# Patient Record
Sex: MALE | Race: WHITE | NOT HISPANIC OR LATINO | Employment: FULL TIME | URBAN - METROPOLITAN AREA
[De-identification: names, ages, dates, MRNs, and addresses within clinical notes are randomized per-mention and may not be internally consistent; named-entity substitution may affect disease eponyms.]

---

## 2022-12-20 ENCOUNTER — OFFICE VISIT (OUTPATIENT)
Dept: SURGERY | Facility: CLINIC | Age: 57
End: 2022-12-20
Payer: COMMERCIAL

## 2022-12-20 VITALS
TEMPERATURE: 97 F | WEIGHT: 192 LBS | HEART RATE: 65 BPM | BODY MASS INDEX: 27.49 KG/M2 | DIASTOLIC BLOOD PRESSURE: 85 MMHG | HEIGHT: 70 IN | SYSTOLIC BLOOD PRESSURE: 133 MMHG

## 2022-12-20 DIAGNOSIS — L98.9 SKIN LESION OF SCALP: Primary | ICD-10-CM

## 2022-12-20 PROCEDURE — 11421 PR EXC SKIN BENIG 0.6-1 CM REMAINDR BODY: ICD-10-PCS | Mod: S$GLB,,, | Performed by: SURGERY

## 2022-12-20 PROCEDURE — 99204 PR OFFICE/OUTPT VISIT, NEW, LEVL IV, 45-59 MIN: ICD-10-PCS | Mod: 25,S$GLB,, | Performed by: SURGERY

## 2022-12-20 PROCEDURE — 11421 EXC H-F-NK-SP B9+MARG 0.6-1: CPT | Mod: S$GLB,,, | Performed by: SURGERY

## 2022-12-20 PROCEDURE — 99204 OFFICE O/P NEW MOD 45 MIN: CPT | Mod: 25,S$GLB,, | Performed by: SURGERY

## 2022-12-20 RX ORDER — LOSARTAN POTASSIUM 100 MG/1
100 TABLET ORAL DAILY
COMMUNITY

## 2022-12-20 NOTE — PROCEDURES
"Yfn Be is a 57 y.o. male patient.    Temp: 97.3 °F (36.3 °C) (12/20/22 1601)  Pulse: 65 (12/20/22 1601)  BP: 133/85 (12/20/22 1601)  Weight: 87.1 kg (192 lb) (12/20/22 1601)  Height: 5' 10" (177.8 cm) (12/20/22 1601)       Excision of Lesion - left temple    Date/Time: 12/20/2022 3:30 PM  Performed by: Clement Medina III, MD  Authorized by: Clement Medina III, MD     Local anesthesia used?: Yes    Anesthesia:  Local infiltration  Local anesthetic:  Lidocaine 1% with epinephrine  Anesthetic total (ml):  3  : raised, scaly skin lesion.  Body area:  Scalp (left temple)  Laterality:  Left  Position:  Lateral   Patient was prepped and draped in the normal sterile fashion.  Anesthesia:  Local infiltration  Local anesthetic:  Lidocaine 1% with epinephrine  Excision type:  Tumor  Excision depth:  Subcutaneous  Excision size (cm):  1  Scalpel size:  15  Incision type:  Elliptical  Specimens?: Yes     Specimens submitted to pathology.   Hemostasis was obtained.  Estimated blood loss (cc):  2  Wound closure:  Simple  Wound repair size (cm):  1  Sutures: 3-0 chromic.  Sterile dressings:  Tegaderm  Post-op diagnosis:  Same as pre-op diagnosis.   Needle, instrument, and sponge counts were correct.   Patient tolerated the procedure well with no immediate complications.   Post-operative instructions were provided for the patient.   Patient was discharged and will follow up for wound check and pathology results.    12/20/2022    "

## 2022-12-21 NOTE — PROGRESS NOTES
Subjective:       Patient ID: Yfn Be is a 57 y.o. male.    Chief Complaint: Consult (Skin lesion Lt temple)      HPI:  57-year-old male presents with new raised skin lesion over the left temple.  He noticed the lesion while running his fingers across his temples last week.  There is no tenderness.  There is no erythema or ulceration.  No history of prior skin cancers.  No family history of skin cancer.  He reports many years of sun exposure.    Past Medical History:   Diagnosis Date    Hypertension      No past surgical history on file.  Review of patient's allergies indicates:  No Known Allergies  Medication List with Changes/Refills   Current Medications    LOSARTAN (COZAAR) 100 MG TABLET    Take 100 mg by mouth once daily.     History reviewed. No pertinent family history.  Social History     Socioeconomic History    Marital status: Single         Review of Systems    Objective:      Physical Exam  Constitutional:       General: He is not in acute distress.     Appearance: Normal appearance. He is well-developed. He is not toxic-appearing.   HENT:      Head: Normocephalic and atraumatic. No abrasion or laceration.        Comments: 8-10 mm by 4-5 mm raised, scaly lesion left temple.  No tenderness.  No ulceration.     Right Ear: External ear normal.      Left Ear: External ear normal.      Nose: Nose normal.   Eyes:      Pupils: Pupils are equal, round, and reactive to light.   Neck:      Trachea: Trachea normal. No tracheal deviation.   Cardiovascular:      Rate and Rhythm: Normal rate and regular rhythm.   Pulmonary:      Effort: Pulmonary effort is normal. No tachypnea, accessory muscle usage or respiratory distress.   Abdominal:      General: There is no distension.      Palpations: Abdomen is soft.      Tenderness: There is no abdominal tenderness.   Musculoskeletal:      Cervical back: Neck supple. Normal range of motion.   Lymphadenopathy:      Cervical: No cervical adenopathy.   Skin:     General:  Skin is warm.   Neurological:      Mental Status: He is alert and oriented to person, place, and time.      Coordination: Coordination normal.      Gait: Gait normal.   Psychiatric:         Speech: Speech normal.         Behavior: Behavior normal.       Assessment/Plan:   Skin lesion of scalp  -     Specimen to Pathology Dermatology and skin neoplasms  -     Excision of Lesion - left temple      The skin lesion was excised in the office today.  It was sent for specimen.  Follow-up 1 week for wound check in path results.

## 2022-12-24 ENCOUNTER — HOSPITAL ENCOUNTER (EMERGENCY)
Facility: HOSPITAL | Age: 57
Discharge: HOME | End: 2022-12-24
Attending: EMERGENCY MEDICINE
Payer: COMMERCIAL

## 2022-12-24 VITALS
OXYGEN SATURATION: 96 % | HEIGHT: 70 IN | BODY MASS INDEX: 27.49 KG/M2 | RESPIRATION RATE: 18 BRPM | HEART RATE: 70 BPM | DIASTOLIC BLOOD PRESSURE: 97 MMHG | SYSTOLIC BLOOD PRESSURE: 160 MMHG | WEIGHT: 192 LBS

## 2022-12-24 DIAGNOSIS — I10 HYPERTENSION, UNSPECIFIED TYPE: Primary | ICD-10-CM

## 2022-12-24 DIAGNOSIS — I48.0 PAROXYSMAL ATRIAL FIBRILLATION: ICD-10-CM

## 2022-12-24 LAB
ALBUMIN SERPL BCP-MCNC: 4.5 G/DL (ref 3.5–5.2)
ALP SERPL-CCNC: 54 U/L (ref 55–135)
ALT SERPL W/O P-5'-P-CCNC: 26 U/L (ref 10–44)
ANION GAP SERPL CALC-SCNC: 5 MMOL/L (ref 8–16)
AST SERPL-CCNC: 26 U/L (ref 10–40)
BASOPHILS # BLD AUTO: 0.03 K/UL (ref 0–0.2)
BASOPHILS NFR BLD: 0.6 % (ref 0–1.9)
BILIRUB SERPL-MCNC: 1.4 MG/DL (ref 0.1–1)
BUN SERPL-MCNC: 15 MG/DL (ref 6–20)
CALCIUM SERPL-MCNC: 9.4 MG/DL (ref 8.7–10.5)
CHLORIDE SERPL-SCNC: 106 MMOL/L (ref 95–110)
CO2 SERPL-SCNC: 27 MMOL/L (ref 23–29)
CREAT SERPL-MCNC: 1.2 MG/DL (ref 0.5–1.4)
DIFFERENTIAL METHOD: ABNORMAL
EOSINOPHIL # BLD AUTO: 0.2 K/UL (ref 0–0.5)
EOSINOPHIL NFR BLD: 3.3 % (ref 0–8)
ERYTHROCYTE [DISTWIDTH] IN BLOOD BY AUTOMATED COUNT: 12.2 % (ref 11.5–14.5)
EST. GFR  (NO RACE VARIABLE): >60 ML/MIN/1.73 M^2
GLUCOSE SERPL-MCNC: 106 MG/DL (ref 70–110)
HCT VFR BLD AUTO: 43.2 % (ref 40–54)
HGB BLD-MCNC: 14.8 G/DL (ref 14–18)
IMM GRANULOCYTES # BLD AUTO: 0.02 K/UL (ref 0–0.04)
IMM GRANULOCYTES NFR BLD AUTO: 0.4 % (ref 0–0.5)
LYMPHOCYTES # BLD AUTO: 1.5 K/UL (ref 1–4.8)
LYMPHOCYTES NFR BLD: 29.7 % (ref 18–48)
MAGNESIUM SERPL-MCNC: 2.1 MG/DL (ref 1.6–2.6)
MCH RBC QN AUTO: 32.4 PG (ref 27–31)
MCHC RBC AUTO-ENTMCNC: 34.3 G/DL (ref 32–36)
MCV RBC AUTO: 95 FL (ref 82–98)
MONOCYTES # BLD AUTO: 0.4 K/UL (ref 0.3–1)
MONOCYTES NFR BLD: 7.1 % (ref 4–15)
NEUTROPHILS # BLD AUTO: 3.1 K/UL (ref 1.8–7.7)
NEUTROPHILS NFR BLD: 58.9 % (ref 38–73)
NRBC BLD-RTO: 0 /100 WBC
PLATELET # BLD AUTO: 259 K/UL (ref 150–450)
PMV BLD AUTO: 9 FL (ref 9.2–12.9)
POTASSIUM SERPL-SCNC: 4.2 MMOL/L (ref 3.5–5.1)
PROT SERPL-MCNC: 7.4 G/DL (ref 6–8.4)
RBC # BLD AUTO: 4.57 M/UL (ref 4.6–6.2)
SODIUM SERPL-SCNC: 138 MMOL/L (ref 136–145)
TROPONIN I SERPL HS-MCNC: 7.8 PG/ML (ref 0–14.9)
TSH SERPL DL<=0.005 MIU/L-ACNC: 2.66 UIU/ML (ref 0.34–5.6)
WBC # BLD AUTO: 5.19 K/UL (ref 3.9–12.7)

## 2022-12-24 PROCEDURE — 36415 COLL VENOUS BLD VENIPUNCTURE: CPT | Performed by: EMERGENCY MEDICINE

## 2022-12-24 PROCEDURE — 93005 ELECTROCARDIOGRAM TRACING: CPT | Performed by: INTERNAL MEDICINE

## 2022-12-24 PROCEDURE — 93010 ELECTROCARDIOGRAM REPORT: CPT | Mod: ,,, | Performed by: INTERNAL MEDICINE

## 2022-12-24 PROCEDURE — 83735 ASSAY OF MAGNESIUM: CPT | Performed by: EMERGENCY MEDICINE

## 2022-12-24 PROCEDURE — 84484 ASSAY OF TROPONIN QUANT: CPT | Performed by: EMERGENCY MEDICINE

## 2022-12-24 PROCEDURE — 93010 EKG 12-LEAD: ICD-10-PCS | Mod: 76,,, | Performed by: INTERNAL MEDICINE

## 2022-12-24 PROCEDURE — 85025 COMPLETE CBC W/AUTO DIFF WBC: CPT | Performed by: EMERGENCY MEDICINE

## 2022-12-24 PROCEDURE — 84443 ASSAY THYROID STIM HORMONE: CPT | Performed by: EMERGENCY MEDICINE

## 2022-12-24 PROCEDURE — 99284 EMERGENCY DEPT VISIT MOD MDM: CPT | Mod: 25

## 2022-12-24 PROCEDURE — 80053 COMPREHEN METABOLIC PANEL: CPT | Performed by: EMERGENCY MEDICINE

## 2022-12-24 PROCEDURE — 25000003 PHARM REV CODE 250: Performed by: EMERGENCY MEDICINE

## 2022-12-24 RX ORDER — ACETAMINOPHEN AND CODEINE PHOSPHATE 300; 30 MG/1; MG/1
TABLET ORAL
COMMUNITY
End: 2023-09-12

## 2022-12-24 RX ORDER — METOPROLOL SUCCINATE 50 MG/1
TABLET, EXTENDED RELEASE ORAL
COMMUNITY
End: 2023-09-12

## 2022-12-24 RX ORDER — SIMVASTATIN 10 MG/1
TABLET, FILM COATED ORAL
COMMUNITY
End: 2023-09-12

## 2022-12-24 RX ORDER — PROMETHAZINE HYDROCHLORIDE 25 MG/1
TABLET ORAL
COMMUNITY
End: 2023-09-12

## 2022-12-24 RX ORDER — ROSUVASTATIN CALCIUM 10 MG/1
TABLET, COATED ORAL
COMMUNITY
End: 2023-09-12

## 2022-12-24 RX ORDER — FLECAINIDE ACETATE 50 MG/1
TABLET ORAL
COMMUNITY
End: 2023-09-12

## 2022-12-24 RX ORDER — DILTIAZEM HYDROCHLORIDE 5 MG/ML
0.25 INJECTION INTRAVENOUS
Status: DISCONTINUED | OUTPATIENT
Start: 2022-12-24 | End: 2022-12-24 | Stop reason: HOSPADM

## 2022-12-24 RX ORDER — PANTOPRAZOLE SODIUM 40 MG/1
TABLET, DELAYED RELEASE ORAL
COMMUNITY
End: 2023-09-12

## 2022-12-24 RX ORDER — KETOROLAC TROMETHAMINE 10 MG/1
TABLET, FILM COATED ORAL
COMMUNITY
End: 2023-09-12

## 2022-12-24 RX ORDER — ASPIRIN 325 MG
TABLET, DELAYED RELEASE (ENTERIC COATED) ORAL
COMMUNITY

## 2022-12-24 RX ORDER — AMLODIPINE BESYLATE 5 MG/1
5 TABLET ORAL DAILY
COMMUNITY
End: 2022-12-24

## 2022-12-24 RX ORDER — DILTIAZEM HYDROCHLORIDE 30 MG/1
120 TABLET, FILM COATED ORAL
Status: COMPLETED | OUTPATIENT
Start: 2022-12-24 | End: 2022-12-24

## 2022-12-24 RX ORDER — CARISOPRODOL 250 MG/1
TABLET ORAL
COMMUNITY
End: 2023-09-12

## 2022-12-24 RX ORDER — MEFLOQUINE HYDROCHLORIDE 250 MG/1
TABLET ORAL
COMMUNITY
End: 2023-09-12

## 2022-12-24 RX ORDER — ONDANSETRON 4 MG/1
TABLET, FILM COATED ORAL
COMMUNITY
End: 2023-09-12

## 2022-12-24 RX ORDER — CELECOXIB 200 MG/1
CAPSULE ORAL
COMMUNITY
End: 2023-09-12

## 2022-12-24 RX ORDER — DILTIAZEM HYDROCHLORIDE 120 MG/1
120 TABLET, FILM COATED ORAL DAILY
Qty: 30 TABLET | Refills: 11 | Status: SHIPPED | OUTPATIENT
Start: 2022-12-24 | End: 2023-09-12

## 2022-12-24 RX ADMIN — DILTIAZEM HYDROCHLORIDE 120 MG: 30 TABLET, FILM COATED ORAL at 12:12

## 2022-12-24 NOTE — ED PROVIDER NOTES
Encounter Date: 12/24/2022       History   No chief complaint on file.    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  He denies any associated chest pain, shortness of breath headache or dizziness.  He is had no thyroid disease or history of caffeine excess.  Does not smoke.  His last oral intake was at 8:00 p.m. last night with the exception of having taken his losartan 100 mg and Eliquis 5 mg this morning.  No history of any coronary disease.    Review of patient's allergies indicates:  No Known Allergies  Past Medical History:   Diagnosis Date    Hypertension      No past surgical history on file.  No family history on file.     Review of Systems   Constitutional:  Negative for chills, diaphoresis and fever.   HENT:  Negative for congestion, ear pain, rhinorrhea, sinus pressure, sinus pain, sore throat and trouble swallowing.    Respiratory:  Negative for cough, chest tightness and shortness of breath.    Cardiovascular:  Positive for palpitations. Negative for chest pain.   Gastrointestinal:  Negative for abdominal pain, nausea and vomiting.   Genitourinary:  Negative for difficulty urinating, flank pain, frequency and hematuria.   Skin:  Negative for pallor.   All other systems reviewed and are negative.    Physical Exam     Initial Vitals [12/24/22 1005]   BP Pulse Resp Temp SpO2   (!) 170/111 88 20 -- 97 %      MAP       --         Physical Exam    Vitals reviewed.  Constitutional: He appears well-developed and well-nourished. He is not diaphoretic. No distress.   HENT:   Head: Normocephalic and atraumatic.   Right Ear: External ear normal.   Left Ear: External ear normal.   Nose: Nose normal.   Mouth/Throat: Oropharynx is clear and moist.   Eyes: Conjunctivae and EOM are normal. Pupils are equal, round, and reactive to light.   Neck: Neck supple. No tracheal deviation present. No JVD present.   Normal range of  motion.  Cardiovascular:  Normal rate, normal heart sounds and intact distal pulses.     Exam reveals no gallop and no friction rub.       No murmur heard.  Irregular irregular tachycardic rhythm   Pulmonary/Chest: Breath sounds normal. No respiratory distress. He has no wheezes. He has no rhonchi. He has no rales. He exhibits no tenderness.   Abdominal: Abdomen is soft. Bowel sounds are normal. He exhibits no distension. There is no abdominal tenderness. There is no rebound and no guarding.   Musculoskeletal:         General: No tenderness or edema. Normal range of motion.      Cervical back: Normal range of motion and neck supple.     Lymphadenopathy:     He has no cervical adenopathy.   Neurological: He is alert and oriented to person, place, and time. He has normal strength and normal reflexes. No cranial nerve deficit or sensory deficit. GCS score is 15. GCS eye subscore is 4. GCS verbal subscore is 5. GCS motor subscore is 6.   Skin: Skin is warm and dry. Capillary refill takes less than 2 seconds. No rash noted. No erythema. No pallor.   Psychiatric: He has a normal mood and affect. His behavior is normal. Judgment and thought content normal.       ED Course   Procedures  Labs Reviewed   CBC W/ AUTO DIFFERENTIAL - Abnormal; Notable for the following components:       Result Value    RBC 4.57 (*)     MCH 32.4 (*)     MPV 9.0 (*)     All other components within normal limits   COMPREHENSIVE METABOLIC PANEL - Abnormal; Notable for the following components:    Total Bilirubin 1.4 (*)     Alkaline Phosphatase 54 (*)     Anion Gap 5 (*)     All other components within normal limits   MAGNESIUM   TSH   TROPONIN I HIGH SENSITIVITY   TROPONIN I HIGH SENSITIVITY   URINALYSIS, REFLEX TO URINE CULTURE        ECG Results              EKG 12-lead (In process)  Result time 12/24/22 10:27:52      In process by Interface, Lab In [facility] (12/24/22 10:27:52)                   Narrative:    Test Reason : I48.0,    Vent. Rate :  111 BPM     Atrial Rate : 141 BPM     P-R Int : 000 ms          QRS Dur : 086 ms      QT Int : 322 ms       P-R-T Axes : 000 043 041 degrees     QTc Int : 437 ms    Atrial fibrillation with rapid ventricular response  Abnormal ECG  No previous ECGs available    Referred By: [email or web address]   SELF           Confirmed By:                                   Imaging Results              X-Ray Chest AP Portable (Final result)  Result time 12/24/22 11:00:56      Final result by Dylan Redmond MD (12/24/22 11:00:56)                   Narrative:    HISTORY: Atrial fibrillation.    FINDINGS: Portable chest radiograph at 1025 hours with no prior studies for comparison shows the cardiomediastinal silhouette and pulmonary vasculature are within normal limits.    The lungs are normally expanded, with no consolidation, large pleural effusion, or evidence of pulmonary edema. No confluent infiltrates or pneumothorax. There are no significant osseous abnormalities.    IMPRESSION: No evidence of active cardiopulmonary disease.    Electronically signed by:  Dylan Redmond MD  12/24/2022 11:00 AM [facility] Workstation: [id number]                                     Medications   diltiaZEM injection 22 mg (0 mg Intravenous Hold 12/24/22 1030)   diltiaZEM tablet 120 mg (has no administration in time range)                Attending Attestation:             Attending ED Notes:   ED course and Medical decision-making:  This patient who has had a prior history of paroxysmal atrial fib but has not had any last 2 years since he had a ablation, was noted to have been in AFib with RVR upon presentation.  His blood pressure was also elevated despite having taken his losartan 100 mg and Norvasc 5 mg earlier.  The patient had screening labs obtained which were unremarkable including a normal potassium and magnesium.  Just before the patient was to have received IV Cardizem he spontaneously converted into a normal sinus rhythm.  Chest x-ray is unremarkable.  The  patient's repeat EKG after cardioversion was normal.  Subsequently I did speak to Dr. Pérez who suggested stopping his Norvasc and start him on Cardizem 120 mg daily.  He is to follow-up with Dr. Pérez in his office.                 Clinical Impression:   Final diagnoses:  [I48.0] Paroxysmal atrial fibrillation  [I10] Hypertension, unspecified type (Primary)        ED Disposition Condition    Discharge Stable          ED Prescriptions       Medication Sig Dispense Start Date End Date Auth. Provider    diltiaZEM (CARDIZEM) 120 MG tablet Take 1 tablet (120 mg total) by mouth once daily. 30 tablet 12/24/2022 12/24/2023 Edward Nielson Jr., MD          Follow-up Information       Follow up With Specialties Details Why Contact Info    Truman Pérez MD Interventional Cardiology, Cardiology, Cardiovascular Disease Schedule an appointment as soon as possible for a visit  Next week for re-evaluation [address]  [address]  [place] [address]  [phone]               Edward Nielson Jr., MD  12/24/22 1219

## 2022-12-24 NOTE — DISCHARGE INSTRUCTIONS
Stop amlodipine.  Start Cardizem as directed.  Continue losartan.  Follow-up with Dr. Truman Pérez.  Monitor your blood pressure daily.  Return to the ER as needed.  Continue Eliquis

## 2023-05-19 ENCOUNTER — HOSPITAL ENCOUNTER (EMERGENCY)
Facility: HOSPITAL | Age: 58
Discharge: HOME | End: 2023-05-19
Attending: EMERGENCY MEDICINE
Payer: COMMERCIAL

## 2023-05-19 ENCOUNTER — ANESTHESIA (OUTPATIENT)
Dept: EMERGENCY MEDICINE | Facility: HOSPITAL | Age: 58
End: 2023-05-19
Payer: COMMERCIAL

## 2023-05-19 ENCOUNTER — ANESTHESIA EVENT (OUTPATIENT)
Dept: EMERGENCY MEDICINE | Facility: HOSPITAL | Age: 58
End: 2023-05-19
Payer: COMMERCIAL

## 2023-05-19 VITALS
HEIGHT: 70 IN | TEMPERATURE: 98 F | SYSTOLIC BLOOD PRESSURE: 147 MMHG | RESPIRATION RATE: 20 BRPM | WEIGHT: 205 LBS | OXYGEN SATURATION: 100 % | DIASTOLIC BLOOD PRESSURE: 99 MMHG | BODY MASS INDEX: 29.35 KG/M2 | HEART RATE: 62 BPM

## 2023-05-19 DIAGNOSIS — R07.9 CHEST PAIN: ICD-10-CM

## 2023-05-19 DIAGNOSIS — I48.91 ATRIAL FIBRILLATION STATUS POST CARDIOVERSION: ICD-10-CM

## 2023-05-19 DIAGNOSIS — I48.91 ATRIAL FIBRILLATION WITH RVR: Primary | ICD-10-CM

## 2023-05-19 LAB
ALBUMIN SERPL BCP-MCNC: 4.7 G/DL (ref 3.5–5.2)
ALP SERPL-CCNC: 57 U/L (ref 55–135)
ALT SERPL W/O P-5'-P-CCNC: 28 U/L (ref 10–44)
ANION GAP SERPL CALC-SCNC: 10 MMOL/L (ref 8–16)
AST SERPL-CCNC: 27 U/L (ref 10–40)
BASOPHILS # BLD AUTO: 0.04 K/UL (ref 0–0.2)
BASOPHILS NFR BLD: 0.6 % (ref 0–1.9)
BILIRUB SERPL-MCNC: 1.4 MG/DL (ref 0.1–1)
BNP SERPL-MCNC: 64 PG/ML (ref 0–99)
BUN SERPL-MCNC: 15 MG/DL (ref 6–20)
CALCIUM SERPL-MCNC: 9.7 MG/DL (ref 8.7–10.5)
CHLORIDE SERPL-SCNC: 104 MMOL/L (ref 95–110)
CO2 SERPL-SCNC: 26 MMOL/L (ref 23–29)
CREAT SERPL-MCNC: 1.1 MG/DL (ref 0.5–1.4)
DIFFERENTIAL METHOD: ABNORMAL
EOSINOPHIL # BLD AUTO: 0.1 K/UL (ref 0–0.5)
EOSINOPHIL NFR BLD: 1.5 % (ref 0–8)
ERYTHROCYTE [DISTWIDTH] IN BLOOD BY AUTOMATED COUNT: 12.6 % (ref 11.5–14.5)
ERYTHROCYTE [SEDIMENTATION RATE] IN BLOOD BY WESTERGREN METHOD: 1 MM/HR (ref 0–10)
EST. GFR  (NO RACE VARIABLE): >60 ML/MIN/1.73 M^2
GLUCOSE SERPL-MCNC: 111 MG/DL (ref 70–110)
HCT VFR BLD AUTO: 44.8 % (ref 40–54)
HGB BLD-MCNC: 15.3 G/DL (ref 14–18)
IMM GRANULOCYTES # BLD AUTO: 0.02 K/UL (ref 0–0.04)
IMM GRANULOCYTES NFR BLD AUTO: 0.3 % (ref 0–0.5)
INR PPP: 1 (ref 0.8–1.2)
LYMPHOCYTES # BLD AUTO: 1.8 K/UL (ref 1–4.8)
LYMPHOCYTES NFR BLD: 29.9 % (ref 18–48)
MCH RBC QN AUTO: 31.9 PG (ref 27–31)
MCHC RBC AUTO-ENTMCNC: 34.2 G/DL (ref 32–36)
MCV RBC AUTO: 93 FL (ref 82–98)
MONOCYTES # BLD AUTO: 0.5 K/UL (ref 0.3–1)
MONOCYTES NFR BLD: 8 % (ref 4–15)
NEUTROPHILS # BLD AUTO: 3.7 K/UL (ref 1.8–7.7)
NEUTROPHILS NFR BLD: 59.7 % (ref 38–73)
NRBC BLD-RTO: 0 /100 WBC
PLATELET # BLD AUTO: 253 K/UL (ref 150–450)
PMV BLD AUTO: 9 FL (ref 9.2–12.9)
POTASSIUM SERPL-SCNC: 4.2 MMOL/L (ref 3.5–5.1)
PROT SERPL-MCNC: 7.3 G/DL (ref 6–8.4)
PROTHROMBIN TIME: 10.6 SEC (ref 9–12.5)
RBC # BLD AUTO: 4.8 M/UL (ref 4.6–6.2)
SODIUM SERPL-SCNC: 140 MMOL/L (ref 136–145)
TROPONIN I SERPL HS-MCNC: 5.4 PG/ML (ref 0–14.9)
TSH SERPL DL<=0.005 MIU/L-ACNC: 2.26 UIU/ML (ref 0.34–5.6)
WBC # BLD AUTO: 6.16 K/UL (ref 3.9–12.7)

## 2023-05-19 PROCEDURE — D9220A PRA ANESTHESIA: ICD-10-PCS | Mod: ANES,,, | Performed by: ANESTHESIOLOGY

## 2023-05-19 PROCEDURE — 84443 ASSAY THYROID STIM HORMONE: CPT | Performed by: NURSE PRACTITIONER

## 2023-05-19 PROCEDURE — 93010 EKG 12-LEAD: ICD-10-PCS | Mod: ,,, | Performed by: INTERNAL MEDICINE

## 2023-05-19 PROCEDURE — 93010 ELECTROCARDIOGRAM REPORT: CPT | Mod: ,,, | Performed by: INTERNAL MEDICINE

## 2023-05-19 PROCEDURE — 85651 RBC SED RATE NONAUTOMATED: CPT | Performed by: NURSE PRACTITIONER

## 2023-05-19 PROCEDURE — 85025 COMPLETE CBC W/AUTO DIFF WBC: CPT | Performed by: NURSE PRACTITIONER

## 2023-05-19 PROCEDURE — 83735 ASSAY OF MAGNESIUM: CPT | Performed by: NURSE PRACTITIONER

## 2023-05-19 PROCEDURE — 85610 PROTHROMBIN TIME: CPT | Performed by: NURSE PRACTITIONER

## 2023-05-19 PROCEDURE — 99285 EMERGENCY DEPT VISIT HI MDM: CPT | Mod: 25

## 2023-05-19 PROCEDURE — 80053 COMPREHEN METABOLIC PANEL: CPT | Performed by: NURSE PRACTITIONER

## 2023-05-19 PROCEDURE — D9220A PRA ANESTHESIA: Mod: CRNA,,, | Performed by: NURSE ANESTHETIST, CERTIFIED REGISTERED

## 2023-05-19 PROCEDURE — 63600175 PHARM REV CODE 636 W HCPCS: Performed by: ANESTHESIOLOGY

## 2023-05-19 PROCEDURE — 92960 CARDIOVERSION ELECTRIC EXT: CPT

## 2023-05-19 PROCEDURE — 86140 C-REACTIVE PROTEIN: CPT | Performed by: NURSE PRACTITIONER

## 2023-05-19 PROCEDURE — D9220A PRA ANESTHESIA: Mod: ANES,,, | Performed by: ANESTHESIOLOGY

## 2023-05-19 PROCEDURE — 84484 ASSAY OF TROPONIN QUANT: CPT | Performed by: NURSE PRACTITIONER

## 2023-05-19 PROCEDURE — 25000003 PHARM REV CODE 250: Performed by: ANESTHESIOLOGY

## 2023-05-19 PROCEDURE — 36415 COLL VENOUS BLD VENIPUNCTURE: CPT | Performed by: NURSE PRACTITIONER

## 2023-05-19 PROCEDURE — 93005 ELECTROCARDIOGRAM TRACING: CPT | Performed by: INTERNAL MEDICINE

## 2023-05-19 PROCEDURE — D9220A PRA ANESTHESIA: ICD-10-PCS | Mod: CRNA,,, | Performed by: NURSE ANESTHETIST, CERTIFIED REGISTERED

## 2023-05-19 PROCEDURE — 83880 ASSAY OF NATRIURETIC PEPTIDE: CPT | Performed by: NURSE PRACTITIONER

## 2023-05-19 RX ORDER — LIDOCAINE HYDROCHLORIDE 10 MG/ML
INJECTION INFILTRATION; PERINEURAL
Status: DISCONTINUED | OUTPATIENT
Start: 2023-05-19 | End: 2023-05-19

## 2023-05-19 RX ORDER — PROPOFOL 10 MG/ML
VIAL (ML) INTRAVENOUS
Status: DISCONTINUED | OUTPATIENT
Start: 2023-05-19 | End: 2023-05-19

## 2023-05-19 RX ADMIN — LIDOCAINE HYDROCHLORIDE 20 MG: 10 INJECTION, SOLUTION INFILTRATION; PERINEURAL at 01:05

## 2023-05-19 RX ADMIN — PROPOFOL 150 MG: 10 INJECTION, EMULSION INTRAVENOUS at 01:05

## 2023-05-19 NOTE — ANESTHESIA PREPROCEDURE EVALUATION
05/19/2023  Yfn Be is a 57 y.o., male.      There is no problem list on file for this patient.      History reviewed. No pertinent surgical history.     Tobacco Use:  The patient  has no history on file for tobacco use.         Imaging Results    None          Lab Results   Component Value Date    WBC 6.16 05/19/2023    HGB 15.3 05/19/2023    HCT 44.8 05/19/2023    MCV 93 05/19/2023     05/19/2023     BMP  Lab Results   Component Value Date     05/19/2023    K 4.2 05/19/2023     05/19/2023    CO2 26 05/19/2023    BUN 15 05/19/2023    CREATININE 1.1 05/19/2023    CALCIUM 9.7 05/19/2023    ANIONGAP 10 05/19/2023     (H) 05/19/2023     12/24/2022       No results found for this or any previous visit.                Pre-op Assessment    I have reviewed the Patient Summary Reports.     I have reviewed the Nursing Notes. I have reviewed the NPO Status.   I have reviewed the Medications.     Review of Systems  Anesthesia Hx:  No problems with previous Anesthesia  Denies Family Hx of Anesthesia complications.   Denies Personal Hx of Anesthesia complications.   Social:  Non-Smoker    Hematology/Oncology:  Hematology Normal        Cardiovascular:   Hypertension, well controlled Dysrhythmias (hx of Afib, prior ablation, prior cardioversion)    Pulmonary:  Pulmonary Normal    Hepatic/GI:  Hepatic/GI Normal    Musculoskeletal:  Musculoskeletal Normal    Neurological:  Neurology Normal    Endocrine:  Endocrine Normal        Physical Exam  General: Well nourished, Cooperative, Alert and Oriented    Airway:  Mallampati: III / II  Mouth Opening: Normal  TM Distance: Normal  Tongue: Normal  Neck ROM: Normal ROM    Dental:  Intact    Chest/Lungs:  Clear to auscultation    Heart:  Rate: Normal  Rhythm: Regular Rhythm  Sounds: Normal    Abdomen:  Normal, Soft, Nontender        Anesthesia  Plan  Type of Anesthesia, risks & benefits discussed:    Anesthesia Type: Gen Natural Airway  Intra-op Monitoring Plan: Standard ASA Monitors  Post Op Pain Control Plan:   (medical reason for not using multimodal pain management)  Induction:  IV  Informed Consent: Informed consent signed with the Patient and all parties understand the risks and agree with anesthesia plan.  All questions answered. Patient consented to blood products? No  ASA Score: 3 Emergent  Anesthesia Plan Notes:   General Natural Airway  Propofol  Zofran    Ready For Surgery From Anesthesia Perspective.     .

## 2023-05-19 NOTE — ANESTHESIA POSTPROCEDURE EVALUATION
Anesthesia Post Evaluation    Patient: Yfn Be    Procedure(s) Performed: Cardioversion    Final Anesthesia Type: general      Patient location during evaluation: ED  Patient participation: Yes- Able to Participate  Level of consciousness: awake and alert and oriented  Post-procedure vital signs: reviewed and stable  Pain management: adequate  Airway patency: patent    PONV status at discharge: No PONV  Anesthetic complications: no      Cardiovascular status: blood pressure returned to baseline  Respiratory status: unassisted and spontaneous ventilation  Hydration status: euvolemic  Follow-up not needed.          Vitals Value Taken Time   /96 05/19/23 1347   Temp 36.8 °C (98.3 °F) 05/19/23 1208   Pulse 55 05/19/23 1414   Resp 12 05/19/23 1414   SpO2 98 % 05/19/23 1414   Vitals shown include unvalidated device data.      No case tracking events are documented in the log.      Pain/Nino Score: No data recorded

## 2023-05-19 NOTE — ANESTHESIA POSTPROCEDURE EVALUATION
Anesthesia Post Evaluation    Patient: Yfn Be    Procedure(s) Performed: * No procedures listed *    Final Anesthesia Type: general      Patient location during evaluation: GI PACU  Patient participation: Yes- Able to Participate  Level of consciousness: awake and alert and oriented  Post-procedure vital signs: reviewed and stable  Pain management: adequate  Airway patency: patent    PONV status at discharge: No PONV  Anesthetic complications: no      Cardiovascular status: blood pressure returned to baseline  Respiratory status: unassisted and spontaneous ventilation  Hydration status: euvolemic  Follow-up not needed.          Vitals Value Taken Time   /76 05/19/23 1413   Temp 36.8 05/19/23 1415   Pulse 55 05/19/23 1413   Resp 12 05/19/23 1413   SpO2 98 % 05/19/23 1413   Vitals shown include unvalidated device data.      No case tracking events are documented in the log.      Pain/Nino Score: No data recorded

## 2023-05-19 NOTE — ED PROVIDER NOTES
Encounter Date: 5/19/2023       History     Chief Complaint   Patient presents with    Tachycardia     A-Fib RVR. Patient has had an ablasion.     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  Patient did not take his antihypertensive medication today.  He denies any complaints of any chest pain but has had some slight shortness of breath.  Patient had tried taking flecainide 50 mg twice today thus far without benefit.  His cardiologist is Kami who had mentioned him that he would planned on probably cardioverting him today.  The patient denied any nausea vomiting or diaphoresis.  He is no history any known thyroid disease.  Rare caffeine use.  Drinks alcohol socially does not smoke.  Allergies none his medications for his blood pressure include losartan 100 mg and Norvasc 5 mg which again he is not taken today.  No complaint of abdominal pain.  No thoracic back    Review of patient's allergies indicates:  No Known Allergies  Past Medical History:   Diagnosis Date    Hypertension      History reviewed. No pertinent surgical history.  History reviewed. No pertinent family history.     Review of Systems   Constitutional:  Negative for activity change, diaphoresis and fever.   HENT: Negative.  Negative for congestion, rhinorrhea, sinus pain and sore throat.    Respiratory:  Positive for shortness of breath. Negative for cough.    Cardiovascular:  Positive for palpitations. Negative for chest pain and leg swelling.   Gastrointestinal:  Negative for abdominal pain, nausea and vomiting.   Genitourinary:  Negative for difficulty urinating.   Skin:  Negative for color change and pallor.   All other systems reviewed and are negative.    Physical Exam     Initial Vitals [05/19/23 1208]   BP Pulse Resp Temp SpO2   (!) 139/100 98 20 98.3 °F (36.8 °C) 99 %      MAP       --         Physical Exam    Vitals reviewed.  Constitutional: He  appears well-developed and well-nourished. He is not diaphoretic. No distress.   HENT:   Head: Normocephalic and atraumatic.   Right Ear: External ear normal.   Left Ear: External ear normal.   Nose: Nose normal.   Mouth/Throat: Oropharynx is clear and moist.   Eyes: Conjunctivae and EOM are normal. Pupils are equal, round, and reactive to light.   Neck: Neck supple. No JVD present.   Normal range of motion.  Cardiovascular:  Normal rate, normal heart sounds and intact distal pulses.     Exam reveals no gallop and no friction rub.       No murmur heard.  Irregular irregular tachycardic rhythm   Pulmonary/Chest: Breath sounds normal. No respiratory distress. He has no wheezes. He has no rhonchi. He has no rales. He exhibits no tenderness.   Abdominal: Abdomen is soft. Bowel sounds are normal. He exhibits no distension and no mass. There is no abdominal tenderness. There is no rebound and no guarding.   Musculoskeletal:         General: No tenderness or edema. Normal range of motion.      Cervical back: Normal range of motion and neck supple.     Lymphadenopathy:     He has no cervical adenopathy.   Neurological: He is alert and oriented to person, place, and time. He has normal strength. No cranial nerve deficit or sensory deficit. GCS score is 15. GCS eye subscore is 4. GCS verbal subscore is 5. GCS motor subscore is 6.   Skin: Skin is warm and dry. Capillary refill takes less than 2 seconds. No rash noted. No erythema. No pallor.   Psychiatric: He has a normal mood and affect. His behavior is normal. Judgment and thought content normal.       ED Course   Procedures  Labs Reviewed - No data to display       Imaging Results    None          Medications - No data to display             Attending Attestation:             Attending ED Notes:   Spoke to Dr. Mcclure with plans on cardioverting the patient in the ED.    Screening labs obtained were reviewed unremarkable.  EKG did show atrial fib with RVR.  Anesthesia was  consulted and the patient was given propofol and cardioverted by Dr. Mcclure.  Subsequently he was in a sinus rhythm with good vital signs and after contacting Dr. Mcclure again patient is able to be discharged to continue all of his normal maintenance medication.                     Clinical Impression:   Final diagnoses:  [R07.9] Chest pain  [I48.91] Atrial fibrillation with RVR (Primary)  [I48.91] Atrial fibrillation status post cardioversion         ED Prescriptions    None       Follow-up Information       Follow up With Specialties Details Why Contact Info    Kirill Mcclure MD Interventional Cardiology, Cardiology  As needed [address]  [address]  [place] [place] [address]  [phone]               Edward Nielson Jr., MD  05/19/23 0097

## 2023-09-12 ENCOUNTER — TELEPHONE (OUTPATIENT)
Dept: PULMONOLOGY | Facility: CLINIC | Age: 58
End: 2023-09-12

## 2023-09-12 ENCOUNTER — OFFICE VISIT (OUTPATIENT)
Dept: PULMONOLOGY | Facility: CLINIC | Age: 58
End: 2023-09-12
Payer: COMMERCIAL

## 2023-09-12 ENCOUNTER — HOSPITAL ENCOUNTER (OUTPATIENT)
Dept: RADIOLOGY | Facility: HOSPITAL | Age: 58
Discharge: HOME | End: 2023-09-12
Attending: NURSE PRACTITIONER
Payer: COMMERCIAL

## 2023-09-12 VITALS
DIASTOLIC BLOOD PRESSURE: 72 MMHG | OXYGEN SATURATION: 97 % | SYSTOLIC BLOOD PRESSURE: 140 MMHG | WEIGHT: 224 LBS | BODY MASS INDEX: 32.14 KG/M2 | HEART RATE: 58 BPM

## 2023-09-12 DIAGNOSIS — R06.00 DYSPNEA, UNSPECIFIED TYPE: Primary | ICD-10-CM

## 2023-09-12 DIAGNOSIS — R06.00 DYSPNEA, UNSPECIFIED TYPE: ICD-10-CM

## 2023-09-12 DIAGNOSIS — R05.1 ACUTE COUGH: Primary | ICD-10-CM

## 2023-09-12 PROCEDURE — 99204 OFFICE O/P NEW MOD 45 MIN: CPT | Mod: S$GLB,,, | Performed by: INTERNAL MEDICINE

## 2023-09-12 PROCEDURE — 71046 X-RAY EXAM CHEST 2 VIEWS: CPT | Mod: TC

## 2023-09-12 PROCEDURE — 99204 PR OFFICE/OUTPT VISIT, NEW, LEVL IV, 45-59 MIN: ICD-10-PCS | Mod: S$GLB,,, | Performed by: INTERNAL MEDICINE

## 2023-09-12 RX ORDER — BENZONATATE 200 MG/1
200 CAPSULE ORAL 3 TIMES DAILY PRN
Qty: 90 CAPSULE | Refills: 11 | Status: SHIPPED | OUTPATIENT
Start: 2023-09-12 | End: 2024-09-06

## 2023-09-12 NOTE — PROGRESS NOTES
SUBJECTIVE:    Patient ID: [id number] Be is a 58 y.o. male.    Chief Complaint: Follow-up (Follow up /Chronic cough for 4 weeks)    HPI The patient started coughing 4 weeks ago.  Was in [place] for 2 weeks and it got worse.  He is now coughing all day long every 30 minutes.  Expectorates a small amount of mucus, clear to mildly brownish tint.  It awakens him at night.  He is not short of breath.  Never smoker.  He has had A fib but is not in A fib now.  Past Medical History:   Diagnosis Date    Hypertension     PAF (paroxysmal atrial fibrillation)      Past Surgical History:   Procedure Laterality Date    SHOULDER ARTHROSCOPY W/ LABRAL REPAIR Bilateral     TONSILLECTOMY Bilateral      No family history on file.     Social History:   Marital Status: Single  Occupation: CRNA  Alcohol History:  has no history on file for alcohol use. occaisonally  Tobacco History:  reports that he has never smoked. He has never been exposed to tobacco smoke. He has never used smokeless tobacco.  Drug History:  has no history on file for drug use.    Review of patient's allergies indicates:  No Known Allergies    Current Outpatient Medications   Medication Sig Dispense Refill    aspirin (ECOTRIN) 325 MG EC tablet Ecotrin 325 mg tablet,enteric coated   Take 1 tablet every day by oral route for 21 days.      losartan (COZAAR) 100 MG tablet Take 100 mg by mouth once daily.      acetaminophen-codeine 300-30mg (TYLENOL #3) 300-30 mg Tab acetaminophen 300 mg-codeine 30 mg tablet   Take 1 tablet every 6 hours by oral route.      apixaban (ELIQUIS) 5 mg Tab Take 5 mg by mouth once daily.      benzonatate (TESSALON) 200 MG capsule Take 1 capsule (200 mg total) by mouth 3 (three) times daily as needed for Cough. 90 capsule 11    carisoprodoL (SOMA) 250 mg tablet carisoprodol 250 mg tablet      celecoxib (CELEBREX) 200 MG capsule Celebrex 200 mg capsule   Take 1 capsule every 12 hours by oral route with meals for 30 days.      diltiaZEM  (CARDIZEM) 120 MG tablet Take 1 tablet (120 mg total) by mouth once daily. 30 tablet 11    flecainide (TAMBOCOR) 50 MG Tab flecainide 50 mg tablet      ketorolac (TORADOL) 10 mg tablet ketorolac 10 mg tablet      mefloquine (LARIAM) 250 mg tablet mefloquine 250 mg tablet      metoprolol succinate (TOPROL-XL) 50 MG 24 hr tablet metoprolol succinate ER 50 mg tablet,extended release 24 hr      ondansetron (ZOFRAN) 4 MG tablet ondansetron HCl 4 mg tablet      pantoprazole (PROTONIX) 40 MG tablet pantoprazole 40 mg tablet,delayed release      promethazine (PHENERGAN) 25 MG tablet promethazine 25 mg tablet   Take 1 tablet every 4 hours by oral route.      rosuvastatin (CRESTOR) 10 MG tablet rosuvastatin 10 mg tablet      simvastatin (ZOCOR) 10 MG tablet simvastatin 10 mg tablet       No current facility-administered medications for this visit.       Alpha-1 Antitrypsin:  Last PFT:   Last CT:    Review of Systems  General: Feelingvery well.    Eyes: Vision is good.  ENT:  No sinusitis or pharyngitis.   Heart:: No chest pain or palpitations.  Lungs: Coughing, sore from coughing.  Clear to brown mucus.   GI: No Nausea, vomiting, constipation, diarrhea, or reflux.  : No dysuria, hesitancy, or nocturia.  Musculoskeletal: No joint pain or myalgias.  Skin: No lesions or rashes.  Neuro: Headache from coughing  Lymph: No edema or adenopathy.  Psych: No anxiety or depression.  Endo: Weight back up again    OBJECTIVE:      BP (!) 140/72 (BP Location: Right arm, Patient Position: Sitting, BP Method: Medium (Manual))   Pulse (!) 58   Wt 101.6 kg (224 lb)   SpO2 97%   BMI 32.14 kg/m²     Physical Exam  GENERAL: Older patient in no distress.  HEENT: Pupils equal and reactive. Extraocular movements intact. Nose intact.      Pharynx moist. No PND seen  NECK: Supple.   HEART: Regular rate and rhythm. No murmur or gallop auscultated.  LUNGS: There are bibasilar crackles.. Lung excursion symmetrical. No change in fremitus  ABDOMEN:  Bowel sounds present. Non-tender, no masses palpated.  EXTREMITIES: Normal muscle tone and joint movement, no cyanosis or clubbing.   LYMPHATICS: No adenopathy palpated, tr edema.  SKIN: Dry, intact, no lesions.   NEURO: Cranial nerves II-XII intact. Motor strength 5/5 bilaterally, upper and lower extremities.  PSYCH: Appropriate affect.    Assessment:       1. Acute cough        The patient has bibasilar crackles trace pitting edema and a productive cough with clear mucus.  There is a concern of pulmonary fibrosis.  Alveolar proteinosis is less likely with a mostly clear chest x-ray.  Patient has no connective tissue disease that he is aware of.  The possibility of this being pulmonary edema is raise but he hiked 10 miles with a 30 lb pack over the weekend, and he has no shortness of breath.  There is no orthopnea.  The patient denies any PND or reflux symptoms.  Plan:       Acute cough  -     CT Chest Without Contrast; Future; Expected date: 09/12/2023  -     Complete PFT with bronchodilator; Future  -     Echo Saline Bubble? No; Future  -     benzonatate (TESSALON) 200 MG capsule; Take 1 capsule (200 mg total) by mouth 3 (three) times daily as needed for Cough.  Dispense: 90 capsule; Refill: 11         Follow up in about 2 weeks (around 9/26/2023).